# Patient Record
Sex: MALE | Race: WHITE
[De-identification: names, ages, dates, MRNs, and addresses within clinical notes are randomized per-mention and may not be internally consistent; named-entity substitution may affect disease eponyms.]

---

## 2019-03-11 ENCOUNTER — HOSPITAL ENCOUNTER (INPATIENT)
Dept: HOSPITAL 82 - MS2 | Age: 54
LOS: 2 days | Discharge: ANOTHER HOSPITAL | DRG: 470 | End: 2019-03-13
Attending: INTERNAL MEDICINE | Admitting: ORTHOPAEDIC SURGERY
Payer: COMMERCIAL

## 2019-03-11 VITALS — SYSTOLIC BLOOD PRESSURE: 108 MMHG | DIASTOLIC BLOOD PRESSURE: 69 MMHG

## 2019-03-11 VITALS — DIASTOLIC BLOOD PRESSURE: 74 MMHG | SYSTOLIC BLOOD PRESSURE: 122 MMHG

## 2019-03-11 VITALS — SYSTOLIC BLOOD PRESSURE: 131 MMHG | DIASTOLIC BLOOD PRESSURE: 78 MMHG

## 2019-03-11 VITALS — DIASTOLIC BLOOD PRESSURE: 68 MMHG | SYSTOLIC BLOOD PRESSURE: 123 MMHG

## 2019-03-11 VITALS — SYSTOLIC BLOOD PRESSURE: 118 MMHG | DIASTOLIC BLOOD PRESSURE: 79 MMHG

## 2019-03-11 VITALS — SYSTOLIC BLOOD PRESSURE: 121 MMHG | DIASTOLIC BLOOD PRESSURE: 77 MMHG

## 2019-03-11 VITALS — DIASTOLIC BLOOD PRESSURE: 70 MMHG | SYSTOLIC BLOOD PRESSURE: 135 MMHG

## 2019-03-11 VITALS — SYSTOLIC BLOOD PRESSURE: 122 MMHG | DIASTOLIC BLOOD PRESSURE: 74 MMHG

## 2019-03-11 DIAGNOSIS — N40.0: ICD-10-CM

## 2019-03-11 DIAGNOSIS — M17.12: Primary | ICD-10-CM

## 2019-03-11 DIAGNOSIS — F41.9: ICD-10-CM

## 2019-03-11 DIAGNOSIS — K21.9: ICD-10-CM

## 2019-03-11 DIAGNOSIS — K75.9: ICD-10-CM

## 2019-03-11 PROCEDURE — 0SRD0J9 REPLACEMENT OF LEFT KNEE JOINT WITH SYNTHETIC SUBSTITUTE, CEMENTED, OPEN APPROACH: ICD-10-PCS | Performed by: ORTHOPAEDIC SURGERY

## 2019-03-11 NOTE — NUR
PT MEDICATED AS ORDERS PROVIDE AND ASSESSED AT THIS TIME. ACE TO LEFT KNEE,
ICEPACK TO KNEE, TOES ARE WARM AND PT ABLE TO MOVE TOES. NO S/O DISTRESS
NOTED, PT DENIES ANY NEEDS OTHER THAN MORE WATER AT THIS TIME. CALL LIGHT AT
BEDSIDE. GUARDS X2 AT BEDSIDE.

## 2019-03-11 NOTE — NUR
PT REPORTS "ITCHING" TO ABDOMEN AND UPPER CHEST;NO REDDNESS OR RASH NOTED;MD
NOTIFIED AND NO NEW ORDERS RECEIVED AT THIS TIME;WILL CONTINUE TO MONITOR

## 2019-03-11 NOTE — NUR
PATIENT HAS JUST GOTTEN UP TO THE FLOOR FROM SURGERY. HE HAS HAD A SPINAL
BLOCK, THEREFORE P.T. WILL BE HELD UNTIL TOMORROW.

## 2019-03-11 NOTE — NUR
PT ARRIVED TO FLOOR VIA HOSPITAL BED IN STABLE CONDITION ACCOMPANIED BY X2
GUARDS AND OR STAFF MEMBER;PT AWAKE, ALERT AND ORIENTED X3;ORIENTED PT TO ROOM
AND CALL LIGHT SYSTEM;PT IS POST OP LEFT KNEE ARTHROPLASTY, 03/11/19;WT AND VS
OBTAINED BY DEION ALEJANDRA;ASSESSMENT COMPLETED;PT DENIES ANY CURRENT PAIN,PAIN
SCALE AND REPORTING EDUCATED IT SHOULD BE NOTED THAT PT DID HAVE A SPINAL
BLOCK;RESPIRATIONS EVEN AND UNLABORED ON O2 @ 2L VIA NC, CLEAR LUNG
SOUNDS;I.S. PROVIDED AND PT EDUCATED ON USE 10X PER HOUR,DEMONSTRATED
UNDERSTANDING;ABDOMEN DISTENDED/SOFT ON PALPATION AND HYPOACTIVE IN ALL 4
QUADRANTS,LAST BM 03/10/19;LEFT KNEE ACE WRAP DRESSING IN PLACE,CDI AND ICE
PACKS PROVIDED;STRONG PEDAL PULSES WITH CAP REFILL LESS THAN 3 SECONDS;SCD'S
IN PLACE;#20G TO LEFT HAND INFUSING LR @ 100ML/HR,SITE APPEARS HEALTHY;ZHAO
CATHETER PATENT DRAINING CLEAR/YELLOW URINE WITH EASE,LEG STRAP IN PLACE;PT
DENIES ANY ADDITIONAL NEEDS AT THIS TIME AND IS ENCOURAGED TO CALL FOR
ASSISTANCE IF NEEDED;CALL LIGHT IN REACH;WILL CONTINUE TO MONITOR

## 2019-03-12 VITALS — DIASTOLIC BLOOD PRESSURE: 73 MMHG | SYSTOLIC BLOOD PRESSURE: 120 MMHG

## 2019-03-12 VITALS — SYSTOLIC BLOOD PRESSURE: 125 MMHG | DIASTOLIC BLOOD PRESSURE: 77 MMHG

## 2019-03-12 VITALS — SYSTOLIC BLOOD PRESSURE: 140 MMHG | DIASTOLIC BLOOD PRESSURE: 76 MMHG

## 2019-03-12 VITALS — DIASTOLIC BLOOD PRESSURE: 84 MMHG | SYSTOLIC BLOOD PRESSURE: 141 MMHG

## 2019-03-12 VITALS — DIASTOLIC BLOOD PRESSURE: 79 MMHG | SYSTOLIC BLOOD PRESSURE: 143 MMHG

## 2019-03-12 VITALS — SYSTOLIC BLOOD PRESSURE: 167 MMHG | DIASTOLIC BLOOD PRESSURE: 94 MMHG

## 2019-03-12 LAB
HCT VFR BLD AUTO: 36.5 % (ref 39–50)
HGB BLD-MCNC: 12.1 G/DL (ref 14–18)

## 2019-03-12 NOTE — NUR
PT RESTING IN SEMI FOWLERS POSITION WITH X2 GUARDS AT BEDSIDE;RESPIRATIONS
EVEN AND UNLABORED ON RA;PT DENIES ANY CURRENT PAIN OR NEEDS;TEMP DECREASED TO
99.1, AC REMAINS LOWERED;ZHAO CATHETER PATENT DRAINING TO GRAVITY WITH
EASE;ALL SAFETY PRECAUTIONS REINFORCED WITH BED IN THE LOWEST POSITION AND
CALL LIGHT IN REACH;WILL CONTINUE TO MONITOR

## 2019-03-12 NOTE — NUR
ROSA REPORTS THAT PT IV WAS FOUND "HANGING OUT/REMOVED" PT DENIES KNOWLEDGE OF
WHAT HAPPENED TO IV SITE. POC DISCUSSED W/PT, PT QUESTIONED WRITER ON PURPOSE
OF IV SITE, MEDICATIONS AND POC DISCUSSED W/PT AGAIN AND HE ASKED NOT TO HAVE
NEW IV SITE BACK IN DUE TO LACK OF IV MEDICATION.

## 2019-03-12 NOTE — NUR
Pt seen this pm for treatment. He was awake in bed, A/AAROM in supine and
sitting, including quad and gluteal set, hip IR/ABD/heel slides, LAQ. Pt was
able to contract quad. Supine to sit with bed rail and supervision, assist
with LLE required to move to supine. Proper use of crutches and gait pattern
reviewed. Pt ambulated 1 x 20' with CGA assist using crutches and WB as
tolerated. Pt returned to supin and positioned properly. Guards present in
room.

## 2019-03-12 NOTE — NUR
PT MEDICATED AS ORDERS PROVIDE. LUNG SOUNDS ARE CLEAR, ABD SOFT NON-TENDER,
ACE TO L KNEE CDI, PEDAL PULSES ARE STRONG W/TRACE EDEMA W/GOOD ROM TO
TOES/FEET. PT LOCX3. GUARDS X2 AT BEDSIDE. POC DISCUSSED. PT REPORTS USING
IS/AT BEDSIDE. DENIES DIFFICULTY URINATING/USING URINAL AT BEDSIDE. WILL
CONTINUE TO MONITOR, PT HAS BEEN ENCOURAGED TO CALL.

## 2019-03-12 NOTE — NUR
PT WAS SLEEPING AS I ENTERED THE ROOM. PT MEDICATED W/IV ANTIBIOTIC THERAPY AS
ORDERS PROVIDE. NO S/O DISTRESS NOTED. GUARDS X2 AT BEDSIDE.

## 2019-03-12 NOTE — NUR
REPORT RECEIVED FROM MICHOACANO JONES;PT APPEARS TO BE SLEEPING IN SEMI FOWLERS
POSITION WITH X2 GUARDS AT BEDSIDE;RESPIRATIONS APPEAR EVEN AND UNLABORED ON
O2 @ 2L VIA NC;NO S/S OF DISTRESS NOTED;FALL PRECAUTIONS IN PLACE WITH BED IN
THE LOWEST POSITION AND CALL LIGHT IN REACH;WILL CONTINUE TO MONITOR

## 2019-03-12 NOTE — NUR
PT RESTING IN SEMI FOWLERS POSITION WITH X2 GUARDS AT BEDSIDE;VS OBTAINED AND
ASSESSMENT COMPLETED,CURRENT TEMP 100.6. PT TO BE MEDICATED WITH PRN TYELNOL
650MG PO PER ORDER,BLANKETS REMOVED AND AC LOWERED;PT IS POST OP DAY #1 LEFT
TOTAL KNEE ARTHROPLASTY 03/11/19 WITH SPINAL BLOCK;PT DENIES ANY CURRENT PAIN
AT THIS TIME,PAIN SCALE AND REPORTING EDUCATED;RESPIRATIONS EVEN AND UNLABORED
ON RA,CLEAR LUNG SOUNDS NOTED;I.S. AT BEDSIDE AND PT DEMONSTRATED
USE,ENCOURAGED USE 10X PER HOUR;ABDOMEN DISTENDED/SOFT ON PALPATION AND ACTIVE
IN ALL 4 QUADRANTS;ZHAO CATHETER PATENT DRAINING NATALIIA/YELLOW URINE,LEG STRAP
IN PLACE. ENCOURAGED PO FLUIDS;ACE WRAP DRESSING REMAINS CDI AT THIS TIME,
TRACE EDEMA NOTED TO LEFT KNEE;#20G TO LEFT HAND FLUSHED AND PATENT,DRESSING
CHANGED AT THIS TIME;PT DENIES ANY ADDITIONAL NEEDS AND IS ENCOURAGED TO CALL
FOR ASSISTANCE IF NEEDED;FALL PRECAUTIONS IN PLACE WITH CALL LIGHT IN
REACH;WILL CONTINUE TO MONITOR

## 2019-03-12 NOTE — NUR
PT REPORTS PAIN RATING 7/10 ON THE PAIN SCALE AND REQUESTS PAIN MEDICATION;PT
MEDICATED IWHT PRN PERCOCET 10/325MG 1 COMBO PO,WILL CONTINUE TO MONITOR FOR
EFFECTIVENESS.

## 2019-03-12 NOTE — NUR
PT RESTING IN SEMI FOWLERS POSITION WITH X2 GUARDS AT BEDSIDE;RESPIRATIONS
EVEN AND UNLABORED ON RA;PT REPORTS PAIN RELIEF SINCE PAIN MEDICATION
ADMINISTRATION;IV SITE TO LEFT HAND PATENT;PT VOIDED 200CC OF NATALIIA/CLEAR
URINE;PT DENIES ANY ADDITIONAL NEEDS;WILL CONTINUE TO MONITOR

## 2019-03-13 VITALS — SYSTOLIC BLOOD PRESSURE: 144 MMHG | DIASTOLIC BLOOD PRESSURE: 83 MMHG

## 2019-03-13 VITALS — DIASTOLIC BLOOD PRESSURE: 89 MMHG | SYSTOLIC BLOOD PRESSURE: 149 MMHG

## 2019-03-13 VITALS — SYSTOLIC BLOOD PRESSURE: 167 MMHG | DIASTOLIC BLOOD PRESSURE: 86 MMHG

## 2019-03-13 VITALS — DIASTOLIC BLOOD PRESSURE: 90 MMHG | SYSTOLIC BLOOD PRESSURE: 148 MMHG

## 2019-03-13 VITALS — DIASTOLIC BLOOD PRESSURE: 78 MMHG | SYSTOLIC BLOOD PRESSURE: 163 MMHG

## 2019-03-13 VITALS — DIASTOLIC BLOOD PRESSURE: 78 MMHG | SYSTOLIC BLOOD PRESSURE: 127 MMHG

## 2019-03-13 LAB
ALBUMIN SERPL-MCNC: 3 G/DL (ref 3.2–5)
ALP SERPL-CCNC: 75 U/L (ref 38–126)
AMYLASE SERPL-CCNC: 36 U/L (ref 30–110)
ANION GAP SERPL CALCULATED.3IONS-SCNC: 12 MMOL/L
AST SERPL-CCNC: 19 U/L (ref 17–59)
BASOPHILS NFR BLD AUTO: 0 % (ref 0–3)
BUN SERPL-MCNC: 15 MG/DL (ref 9–20)
BUN/CREAT SERPL: 20
CHLORIDE SERPL-SCNC: 103 MMOL/L (ref 95–108)
CO2 SERPL-SCNC: 26 MMOL/L (ref 22–30)
CREAT SERPL-MCNC: 0.8 MG/DL (ref 0.7–1.3)
EOSINOPHIL NFR BLD AUTO: 2 % (ref 0–8)
ERYTHROCYTE [DISTWIDTH] IN BLOOD BY AUTOMATED COUNT: 12.8 % (ref 11.5–15.5)
HCT VFR BLD AUTO: 35 % (ref 39–50)
HGB BLD-MCNC: 11.8 G/DL (ref 14–18)
IMM GRANULOCYTES NFR BLD: 0.2 % (ref 0–5)
LIPASE SERPL-CCNC: 40 U/L (ref 23–300)
LYMPHOCYTES NFR BLD: 15 % (ref 15–41)
MAGNESIUM SERPL-MCNC: 1.8 MG/DL (ref 1.6–2.3)
MCH RBC QN AUTO: 31.1 PG  CALC (ref 26–32)
MCHC RBC AUTO-ENTMCNC: 33.7 G/L CALC (ref 32–36)
MCV RBC AUTO: 92.1 FL  CALC (ref 80–100)
MONOCYTES NFR BLD AUTO: 15 % (ref 2–13)
NEUTROPHILS # BLD AUTO: 6.31 THOU/UL (ref 1.82–7.42)
NEUTROPHILS NFR BLD AUTO: 68 % (ref 42–76)
PLATELET # BLD AUTO: 142 THOU/UL (ref 130–400)
POTASSIUM SERPL-SCNC: 3.6 MMOL/L (ref 3.5–5.1)
PROT SERPL-MCNC: 5.2 G/DL (ref 6.3–8.2)
RBC # BLD AUTO: 3.8 MILL/UL (ref 4.7–6.1)
SODIUM SERPL-SCNC: 137 MMOL/L (ref 137–146)

## 2019-03-13 NOTE — NUR
PT MEDICATED FOR TEMP .5, ROOM COOLED, BLANKETS REMOVED. PT ASYMPTOMATIC
AT THIS TIME. GUARDS AT BEDSIDE X2

## 2019-03-13 NOTE — NUR
REPORT RECEIVED FROM MICHOACANO JONES;PT APPEARS TO BE SLEEPING IN SEMI FOWLERS
POSITION WITH X2 GUARDS AT BEDSIDE;RESPIRATIONS EVEN AND UNLABORED ON RA;NO
S/S OF DISTRESS NOTED;FALL PRECAUTIONS IN PLACE WITH BED IN THE LOWEST
POSITION AND CALL LIGHT IN REACH;WILL CONTINUE TO MONITOR

## 2019-03-13 NOTE — NUR
Discharge instructions given. Patient verbalizes understanding of same.
Discharged in stable condition via Wheelchair to *Other with
*Other. All belongings sent with pt.
Pt transported to [facility] via wheelchair in stable condition accompanied by
two holly.

## 2019-03-13 NOTE — NUR
Pt seen this pm for treatment. He was in bed. AROM/AAROM to LLE including
quad/gluteal set, heel slides, TKE, hip abd/IR, ankle DF, LAQ 20 reps each. Pt
moved supine to sit with supervision. Ambulated 1x 80' with CGA and verbal
cues for proper gait pattern. Pt returned to room and left sitting in chair
with call bell and tray in reach, guards present.

## 2019-03-13 NOTE — NUR
PT RESTING IN BED EATING LUNCH WITH X2 GUARDS AT BEDSIDE;RESPIRATIONS EVEN AND
UNLABORED ON RA;PT DENIES ANY CURRENT PAIN OR NEEDS;ASSESSMENT REMAINS
UNCHANGED AT THIS TIME;ENCOURAGED TO CALL FOR ASSISTANCE IF NEEDED;CALL LIGHT
IN REACH;WILL CONTINUE TO MONITOR

## 2019-03-13 NOTE — NUR
PT RESTING IN SEMI FOWLERS POSITION WITH X2 GUARDS AT BEDSIDE;RESPIRATIONS
EVEN AND UNLABORED ON RA;PT DENIES ANY CURRENT PAIN OR NEEDS;AWAITING
DISCHARGE ORDERS;CALL LIGHT IN REACH;WILL CONTINUE TO MONITOR

## 2019-03-13 NOTE — NUR
3-13-19:
Pt received supine in bed, pt reported he had attempted to use the restroom 2
times during the night but was unsuccessful in bed.  He was able to ambulate
throught out the night with his crutches to go to the restroom.  Pt was then
asked if he wished to participate in therapy today, he acknowledged and agreed
to tx.  He performed some mild therex in bed with ankle pumps, quad sets,
glute sets and heel slides.  From them pt was able to S<>S with Min A.  Pt
then ambulated in hallway 50 ft x 2 with a 10 sec rest break in between.  Pt
was cued on proper mechanics with crutches and keeping his eyes up and proper
breathing mechanics.  Pt was then asisted to bathroom where he was Min A S<>S
to toilet transfer.  Pt had a successful BM and was Min A S<>S to wash hands
and retrun to bed.  Pt was again Min A to S<>S to sit in bed and was left in
bed in supine with pneumonic compression on RLE.
Gil Barrett SPT

## 2019-03-13 NOTE — NUR
PT RESTING IN SEMI FOWLERS POSITION WITH X2 GUARDS AT BEDSIDE AND ONE LEG
SHACKLED TO THE BED;VS OBTAINED AND ASSESSMENT COMPLETED;PT IS POST OP LEFT
TOTAL KNEE ARTHROPLASTY 03/11/19;PT REPORTS PAIN TO LEFT KNEE RATING 6/10 ON
THE PAIN SCALE AND REQUESTS PAIN MEDICATION;PT MEDICATED WITH PRN PERCOCET
10/325MG 1 COMBO PO;RESPIRATIONS EVEN AND UNLABORED,SHALLOW ON RA;I.S. USE
ENCOURAGED AND PT DEMONSTRATED UNDERSTANDING;ABDOMEN DISTENDED/SOFT ON
PALPATION AND ACTIVE IN ALL 4 QUADRANTS;DRESSING TO LEFT KNEE CDI WITH TRACE
EDEMA NOTED;STRONG PEDAL PULSES WITH CAP REFILL LESS THAN 3 SECONDS;NO IV
SITE, PT REFUSED ON NIGHTSHIFT FOR NEW SITE TO BE STARTED AFTER SITE WAS FOUND
DISLODGED;PT DENIES ANY ADDITIONAL NEEDS AT THIS TIME AND IS ENCOURAGED TO
CALL FOR ASSISTANCE IF NEEDED;FALL PRECAUTIONS IN PLACE WITH CALL LIGHT IN
REACH;WILL CONTINUE TO MONITOR

## 2019-03-13 NOTE — NUR
ACE WRAP DRESSING REMOVED AT THIS TIME PER ORDER,PT TOLERATED WELL.DEMABOND
REMAINS CDI AND WELL APPROX.WILL CONTINUE TO MONITOR

## 2019-03-13 NOTE — NUR
ALL DISCHARGE INSTRUCTIONS PROVIDED AT THIS TIME;RX FOR PERCOCET AND ASPIRIN
GIVEN TO THE GUARDS TO RETURN TO [facility]RECTIONAL FACILITY;PT MEDICATED WITH
TYLENOL 650MG PO FOR TEMP .8;PT DENIES ANY ADDITIONAL NEEDS;WHEELCHAIR
TO BE PROVIDED FOR DISCHARGE.

## 2019-03-16 ENCOUNTER — HOSPITAL ENCOUNTER (EMERGENCY)
Dept: HOSPITAL 82 - ED | Age: 54
LOS: 1 days | Discharge: ANOTHER HOSPITAL | DRG: 603 | End: 2019-03-17
Payer: COMMERCIAL

## 2019-03-16 VITALS — SYSTOLIC BLOOD PRESSURE: 128 MMHG | DIASTOLIC BLOOD PRESSURE: 69 MMHG

## 2019-03-16 VITALS — HEIGHT: 71 IN | BODY MASS INDEX: 32.13 KG/M2 | WEIGHT: 229.5 LBS

## 2019-03-16 DIAGNOSIS — L53.9: ICD-10-CM

## 2019-03-16 DIAGNOSIS — R22.42: ICD-10-CM

## 2019-03-16 DIAGNOSIS — Z96.652: ICD-10-CM

## 2019-03-16 DIAGNOSIS — L03.116: Primary | ICD-10-CM

## 2019-03-16 DIAGNOSIS — F17.200: ICD-10-CM

## 2019-03-16 LAB
ANION GAP SERPL CALCULATED.3IONS-SCNC: 11 MMOL/L
APTT PPP: 28.6 SECONDS (ref 20–32.5)
BASOPHILS NFR BLD AUTO: 1 % (ref 0–3)
BUN SERPL-MCNC: 15 MG/DL (ref 9–20)
BUN/CREAT SERPL: 18
CHLORIDE SERPL-SCNC: 100 MMOL/L (ref 95–108)
CO2 SERPL-SCNC: 29 MMOL/L (ref 22–30)
CREAT SERPL-MCNC: 0.8 MG/DL (ref 0.7–1.3)
EOSINOPHIL NFR BLD AUTO: 3 % (ref 0–8)
ERYTHROCYTE [DISTWIDTH] IN BLOOD BY AUTOMATED COUNT: 12.9 % (ref 11.5–15.5)
HCT VFR BLD AUTO: 31.8 % (ref 39–50)
HGB BLD-MCNC: 10.6 G/DL (ref 14–18)
IMM GRANULOCYTES NFR BLD: 0.5 % (ref 0–5)
INR PPP: 0.9 RATIO (ref 0.7–1.3)
LYMPHOCYTES NFR BLD: 18 % (ref 15–41)
MCH RBC QN AUTO: 31.1 PG  CALC (ref 26–32)
MCHC RBC AUTO-ENTMCNC: 33.3 G/L CALC (ref 32–36)
MCV RBC AUTO: 93.3 FL  CALC (ref 80–100)
MONOCYTES NFR BLD AUTO: 16 % (ref 2–13)
NEUTROPHILS # BLD AUTO: 4.95 THOU/UL (ref 1.82–7.42)
NEUTROPHILS NFR BLD AUTO: 62 % (ref 42–76)
PLATELET # BLD AUTO: 235 THOU/UL (ref 130–400)
POTASSIUM SERPL-SCNC: 4 MMOL/L (ref 3.5–5.1)
PROTHROMBIN TIME: 9.7 SECONDS (ref 9–12.5)
RBC # BLD AUTO: 3.41 MILL/UL (ref 4.7–6.1)
SODIUM SERPL-SCNC: 136 MMOL/L (ref 137–146)

## 2019-09-18 ENCOUNTER — HOSPITAL ENCOUNTER (EMERGENCY)
Dept: HOSPITAL 82 - ED | Age: 54
Discharge: NURSING, REHAB OR LONG-TERM CARE | DRG: 81 | End: 2019-09-18
Payer: COMMERCIAL

## 2019-09-18 VITALS — SYSTOLIC BLOOD PRESSURE: 129 MMHG | DIASTOLIC BLOOD PRESSURE: 67 MMHG

## 2019-09-18 VITALS — WEIGHT: 223.55 LBS | BODY MASS INDEX: 31.3 KG/M2 | HEIGHT: 71 IN

## 2019-09-18 DIAGNOSIS — R40.20: Primary | ICD-10-CM

## 2019-09-18 LAB
ALBUMIN SERPL-MCNC: 3.5 G/DL (ref 3.2–5)
ALP SERPL-CCNC: 92 U/L (ref 38–126)
ANION GAP SERPL CALCULATED.3IONS-SCNC: 14 MMOL/L
AST SERPL-CCNC: 28 U/L (ref 17–59)
BARBITURATES UR-MCNC: NEGATIVE UG/ML
BASOPHILS NFR BLD AUTO: 0 % (ref 0–3)
BUN SERPL-MCNC: 22 MG/DL (ref 9–20)
BUN/CREAT SERPL: 19
CHLORIDE SERPL-SCNC: 103 MMOL/L (ref 95–108)
CO2 SERPL-SCNC: 29 MMOL/L (ref 22–30)
COCAINE UR-MCNC: NEGATIVE NG/ML
CREAT SERPL-MCNC: 1.2 MG/DL (ref 0.7–1.3)
EOSINOPHIL NFR BLD AUTO: 0 % (ref 0–8)
ERYTHROCYTE [DISTWIDTH] IN BLOOD BY AUTOMATED COUNT: 14.2 % (ref 11.5–15.5)
HCT VFR BLD AUTO: 42.2 % (ref 39–50)
HGB BLD-MCNC: 14 G/DL (ref 14–18)
IMM GRANULOCYTES NFR BLD: 0.8 % (ref 0–5)
LYMPHOCYTES NFR BLD: 5 % (ref 15–41)
MCH RBC QN AUTO: 30.5 PG  CALC (ref 26–32)
MCHC RBC AUTO-ENTMCNC: 33.2 G/L CALC (ref 32–36)
MCV RBC AUTO: 91.9 FL  CALC (ref 80–100)
METHADONE SERPL-MCNC: NEGATIVE NG/ML
MONOCYTES NFR BLD AUTO: 7 % (ref 2–13)
MYOGLOBIN SERPL-MCNC: 396 NG/ML (ref 0–121)
NEUTROPHILS # BLD AUTO: 10.02 THOU/UL (ref 1.82–7.42)
NEUTROPHILS NFR BLD AUTO: 86 % (ref 42–76)
OXCYCODONE: POSITIVE
PLATELET # BLD AUTO: 161 THOU/UL (ref 130–400)
POTASSIUM SERPL-SCNC: 4.5 MMOL/L (ref 3.5–5.1)
PROT SERPL-MCNC: 6.1 G/DL (ref 6.3–8.2)
RBC # BLD AUTO: 4.59 MILL/UL (ref 4.7–6.1)
SODIUM SERPL-SCNC: 141 MMOL/L (ref 137–146)
TETRAHYDROCANNABIONOL: NEGATIVE
TRICYLIC ANTIDEPRESSANTS: NEGATIVE

## 2019-09-18 PROCEDURE — 0BH17EZ INSERTION OF ENDOTRACHEAL AIRWAY INTO TRACHEA, VIA NATURAL OR ARTIFICIAL OPENING: ICD-10-PCS | Performed by: EMERGENCY MEDICINE

## 2019-09-18 PROCEDURE — 0T9B70Z DRAINAGE OF BLADDER WITH DRAINAGE DEVICE, VIA NATURAL OR ARTIFICIAL OPENING: ICD-10-PCS | Performed by: EMERGENCY MEDICINE

## 2019-09-18 NOTE — NUR
PATIENT GOT INTUBATED AT 0415 WITH A 8.0 ETT AT 23 CM OF THE LIPS. TUBE
VERIFIED BY CO2 DETECTOR, BREATH SOUND AND THEN X-RAY. PLACED ON VENTILATOR
WITH THE FOLLOWING SETTING : AC 16, , PEEP 5 WITH 100%. ABG ORDERED FOR
30 MINUTES AFTER BEING ON THE VENTILATOR. WILL CONTINUE MONITORING THE
PATIENT.

## 2020-02-21 ENCOUNTER — HOSPITAL ENCOUNTER (INPATIENT)
Age: 55
Discharge: ANOTHER HOSPITAL | DRG: 917 | End: 2020-02-21
Admitting: INTERNAL MEDICINE
Payer: COMMERCIAL

## 2020-02-21 VITALS — DIASTOLIC BLOOD PRESSURE: 56 MMHG | SYSTOLIC BLOOD PRESSURE: 103 MMHG

## 2020-02-21 VITALS — SYSTOLIC BLOOD PRESSURE: 83 MMHG | DIASTOLIC BLOOD PRESSURE: 60 MMHG

## 2020-02-21 VITALS — SYSTOLIC BLOOD PRESSURE: 125 MMHG | DIASTOLIC BLOOD PRESSURE: 82 MMHG

## 2020-02-21 VITALS — DIASTOLIC BLOOD PRESSURE: 52 MMHG | SYSTOLIC BLOOD PRESSURE: 127 MMHG

## 2020-02-21 VITALS — SYSTOLIC BLOOD PRESSURE: 96 MMHG | DIASTOLIC BLOOD PRESSURE: 54 MMHG

## 2020-02-21 VITALS — DIASTOLIC BLOOD PRESSURE: 51 MMHG | SYSTOLIC BLOOD PRESSURE: 97 MMHG

## 2020-02-21 VITALS — DIASTOLIC BLOOD PRESSURE: 60 MMHG | SYSTOLIC BLOOD PRESSURE: 91 MMHG

## 2020-02-21 VITALS — DIASTOLIC BLOOD PRESSURE: 51 MMHG | SYSTOLIC BLOOD PRESSURE: 104 MMHG

## 2020-02-21 VITALS — DIASTOLIC BLOOD PRESSURE: 86 MMHG | SYSTOLIC BLOOD PRESSURE: 153 MMHG

## 2020-02-21 VITALS — DIASTOLIC BLOOD PRESSURE: 52 MMHG | SYSTOLIC BLOOD PRESSURE: 93 MMHG

## 2020-02-21 VITALS — DIASTOLIC BLOOD PRESSURE: 72 MMHG | SYSTOLIC BLOOD PRESSURE: 114 MMHG

## 2020-02-21 VITALS — SYSTOLIC BLOOD PRESSURE: 103 MMHG | DIASTOLIC BLOOD PRESSURE: 62 MMHG

## 2020-02-21 VITALS — SYSTOLIC BLOOD PRESSURE: 93 MMHG | DIASTOLIC BLOOD PRESSURE: 49 MMHG

## 2020-02-21 VITALS — SYSTOLIC BLOOD PRESSURE: 87 MMHG | DIASTOLIC BLOOD PRESSURE: 46 MMHG

## 2020-02-21 VITALS — SYSTOLIC BLOOD PRESSURE: 85 MMHG | DIASTOLIC BLOOD PRESSURE: 52 MMHG

## 2020-02-21 VITALS — DIASTOLIC BLOOD PRESSURE: 88 MMHG | SYSTOLIC BLOOD PRESSURE: 163 MMHG

## 2020-02-21 VITALS — SYSTOLIC BLOOD PRESSURE: 97 MMHG | DIASTOLIC BLOOD PRESSURE: 45 MMHG

## 2020-02-21 VITALS — SYSTOLIC BLOOD PRESSURE: 106 MMHG | DIASTOLIC BLOOD PRESSURE: 63 MMHG

## 2020-02-21 VITALS — SYSTOLIC BLOOD PRESSURE: 103 MMHG | DIASTOLIC BLOOD PRESSURE: 56 MMHG

## 2020-02-21 VITALS — DIASTOLIC BLOOD PRESSURE: 49 MMHG | SYSTOLIC BLOOD PRESSURE: 92 MMHG

## 2020-02-21 VITALS — DIASTOLIC BLOOD PRESSURE: 46 MMHG | SYSTOLIC BLOOD PRESSURE: 77 MMHG

## 2020-02-21 VITALS — DIASTOLIC BLOOD PRESSURE: 37 MMHG | SYSTOLIC BLOOD PRESSURE: 71 MMHG

## 2020-02-21 VITALS — DIASTOLIC BLOOD PRESSURE: 42 MMHG | SYSTOLIC BLOOD PRESSURE: 79 MMHG

## 2020-02-21 VITALS — DIASTOLIC BLOOD PRESSURE: 50 MMHG | SYSTOLIC BLOOD PRESSURE: 96 MMHG

## 2020-02-21 VITALS — DIASTOLIC BLOOD PRESSURE: 68 MMHG | SYSTOLIC BLOOD PRESSURE: 133 MMHG

## 2020-02-21 VITALS — SYSTOLIC BLOOD PRESSURE: 95 MMHG | DIASTOLIC BLOOD PRESSURE: 52 MMHG

## 2020-02-21 VITALS — DIASTOLIC BLOOD PRESSURE: 53 MMHG | SYSTOLIC BLOOD PRESSURE: 99 MMHG

## 2020-02-21 VITALS — DIASTOLIC BLOOD PRESSURE: 79 MMHG | SYSTOLIC BLOOD PRESSURE: 140 MMHG

## 2020-02-21 VITALS — DIASTOLIC BLOOD PRESSURE: 44 MMHG | SYSTOLIC BLOOD PRESSURE: 75 MMHG

## 2020-02-21 VITALS — DIASTOLIC BLOOD PRESSURE: 54 MMHG | SYSTOLIC BLOOD PRESSURE: 93 MMHG

## 2020-02-21 VITALS — SYSTOLIC BLOOD PRESSURE: 136 MMHG | DIASTOLIC BLOOD PRESSURE: 78 MMHG

## 2020-02-21 VITALS — SYSTOLIC BLOOD PRESSURE: 75 MMHG | DIASTOLIC BLOOD PRESSURE: 40 MMHG

## 2020-02-21 VITALS — DIASTOLIC BLOOD PRESSURE: 82 MMHG | SYSTOLIC BLOOD PRESSURE: 137 MMHG

## 2020-02-21 VITALS — DIASTOLIC BLOOD PRESSURE: 73 MMHG | SYSTOLIC BLOOD PRESSURE: 120 MMHG

## 2020-02-21 VITALS — DIASTOLIC BLOOD PRESSURE: 40 MMHG | SYSTOLIC BLOOD PRESSURE: 69 MMHG

## 2020-02-21 VITALS — SYSTOLIC BLOOD PRESSURE: 111 MMHG | DIASTOLIC BLOOD PRESSURE: 75 MMHG

## 2020-02-21 VITALS — SYSTOLIC BLOOD PRESSURE: 168 MMHG | DIASTOLIC BLOOD PRESSURE: 86 MMHG

## 2020-02-21 VITALS — DIASTOLIC BLOOD PRESSURE: 46 MMHG | SYSTOLIC BLOOD PRESSURE: 72 MMHG

## 2020-02-21 VITALS — DIASTOLIC BLOOD PRESSURE: 49 MMHG | SYSTOLIC BLOOD PRESSURE: 91 MMHG

## 2020-02-21 VITALS — DIASTOLIC BLOOD PRESSURE: 81 MMHG | SYSTOLIC BLOOD PRESSURE: 131 MMHG

## 2020-02-21 VITALS — SYSTOLIC BLOOD PRESSURE: 97 MMHG | DIASTOLIC BLOOD PRESSURE: 49 MMHG

## 2020-02-21 VITALS — DIASTOLIC BLOOD PRESSURE: 50 MMHG | SYSTOLIC BLOOD PRESSURE: 91 MMHG

## 2020-02-21 VITALS — DIASTOLIC BLOOD PRESSURE: 39 MMHG | SYSTOLIC BLOOD PRESSURE: 74 MMHG

## 2020-02-21 VITALS — SYSTOLIC BLOOD PRESSURE: 92 MMHG | DIASTOLIC BLOOD PRESSURE: 46 MMHG

## 2020-02-21 VITALS — SYSTOLIC BLOOD PRESSURE: 138 MMHG | DIASTOLIC BLOOD PRESSURE: 74 MMHG

## 2020-02-21 VITALS — DIASTOLIC BLOOD PRESSURE: 52 MMHG | SYSTOLIC BLOOD PRESSURE: 95 MMHG

## 2020-02-21 VITALS — SYSTOLIC BLOOD PRESSURE: 81 MMHG | DIASTOLIC BLOOD PRESSURE: 46 MMHG

## 2020-02-21 VITALS — DIASTOLIC BLOOD PRESSURE: 76 MMHG | SYSTOLIC BLOOD PRESSURE: 115 MMHG

## 2020-02-21 VITALS — SYSTOLIC BLOOD PRESSURE: 120 MMHG | DIASTOLIC BLOOD PRESSURE: 64 MMHG

## 2020-02-21 VITALS — SYSTOLIC BLOOD PRESSURE: 98 MMHG | DIASTOLIC BLOOD PRESSURE: 44 MMHG

## 2020-02-21 VITALS — DIASTOLIC BLOOD PRESSURE: 69 MMHG | SYSTOLIC BLOOD PRESSURE: 116 MMHG

## 2020-02-21 VITALS — SYSTOLIC BLOOD PRESSURE: 110 MMHG | DIASTOLIC BLOOD PRESSURE: 56 MMHG

## 2020-02-21 VITALS — SYSTOLIC BLOOD PRESSURE: 145 MMHG | DIASTOLIC BLOOD PRESSURE: 69 MMHG

## 2020-02-21 VITALS — DIASTOLIC BLOOD PRESSURE: 71 MMHG | SYSTOLIC BLOOD PRESSURE: 138 MMHG

## 2020-02-21 DIAGNOSIS — J96.01: ICD-10-CM

## 2020-02-21 DIAGNOSIS — F32.9: ICD-10-CM
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
## 2020-02-21 DIAGNOSIS — A41.9: ICD-10-CM

## 2020-02-21 DIAGNOSIS — Z91.5: ICD-10-CM

## 2020-02-21 DIAGNOSIS — J69.0: ICD-10-CM

## 2020-02-21 DIAGNOSIS — G62.9: ICD-10-CM

## 2020-02-21 DIAGNOSIS — I95.9: ICD-10-CM

## 2020-02-21 DIAGNOSIS — I48.91: ICD-10-CM

## 2020-02-21 DIAGNOSIS — G93.40: ICD-10-CM

## 2020-02-21 DIAGNOSIS — T40.602A: Primary | ICD-10-CM

## 2020-02-21 DIAGNOSIS — J96.02: ICD-10-CM

## 2020-02-21 DIAGNOSIS — R65.21: ICD-10-CM

## 2020-02-21 DIAGNOSIS — E87.2: ICD-10-CM

## 2020-02-21 LAB
ALBUMIN SERPL-MCNC: 3.1 G/DL (ref 3.2–5)
ALBUMIN SERPL-MCNC: 4.2 G/DL (ref 3.2–5)
ALP SERPL-CCNC: 100 U/L (ref 38–126)
ALP SERPL-CCNC: 92 U/L (ref 38–126)
ANION GAP SERPL CALCULATED.3IONS-SCNC: 12 MMOL/L
ANION GAP SERPL CALCULATED.3IONS-SCNC: 6 MMOL/L
APTT PPP: 26 SECONDS (ref 20–32.5)
AST SERPL-CCNC: 25 U/L (ref 17–59)
AST SERPL-CCNC: 30 U/L (ref 17–59)
BARBITURATES UR-MCNC: NEGATIVE UG/ML
BASOPHILS NFR BLD AUTO: 0 % (ref 0–3)
BILIRUB UR QL STRIP.AUTO: NEGATIVE
BUN SERPL-MCNC: 10 MG/DL (ref 9–20)
BUN SERPL-MCNC: 19 MG/DL (ref 9–20)
BUN/CREAT SERPL: 14
BUN/CREAT SERPL: 16
CHLORIDE SERPL-SCNC: 107 MMOL/L (ref 95–108)
CHLORIDE SERPL-SCNC: 115 MMOL/L (ref 95–108)
CO2 SERPL-SCNC: 25 MMOL/L (ref 22–30)
CO2 SERPL-SCNC: 27 MMOL/L (ref 22–30)
COCAINE UR-MCNC: NEGATIVE NG/ML
COLOR UR AUTO: YELLOW
CREAT SERPL-MCNC: 0.7 MG/DL (ref 0.7–1.3)
CREAT SERPL-MCNC: 1.2 MG/DL (ref 0.7–1.3)
EOSINOPHIL NFR BLD AUTO: 0 % (ref 0–8)
ERYTHROCYTE [DISTWIDTH] IN BLOOD BY AUTOMATED COUNT: 14.8 % (ref 11.5–15.5)
ETHANOL SERPL-MCNC: 0 MG/DL (ref 0–30)
GLUCOSE UR STRIP.AUTO-MCNC: NEGATIVE MG/DL
HCT VFR BLD AUTO: 44.6 % (ref 39–50)
HGB BLD-MCNC: 14.6 G/DL (ref 14–18)
HGB UR QL STRIP.AUTO: (no result)
IMM GRANULOCYTES NFR BLD: 0.4 % (ref 0–5)
INR PPP: 1 RATIO (ref 0.7–1.3)
KETONES UR STRIP.AUTO-MCNC: NEGATIVE MG/DL
LEUKOCYTE ESTERASE UR QL STRIP.AUTO: NEGATIVE
LYMPHOCYTES NFR BLD: 10 % (ref 15–41)
MAGNESIUM SERPL-MCNC: 2.2 MG/DL (ref 1.6–2.3)
MCH RBC QN AUTO: 29.8 PG  CALC (ref 26–32)
MCHC RBC AUTO-ENTMCNC: 32.7 G/L CALC (ref 32–36)
MCV RBC AUTO: 91 FL  CALC (ref 80–100)
METHADONE SERPL-MCNC: NEGATIVE NG/ML
MONOCYTES NFR BLD AUTO: 10 % (ref 2–13)
NEUTROPHILS # BLD AUTO: 10.19 THOU/UL (ref 1.82–7.42)
NEUTROPHILS NFR BLD AUTO: 79 % (ref 42–76)
NITRITE UR QL STRIP.AUTO: NEGATIVE
OXCYCODONE: POSITIVE
PH UR STRIP.AUTO: 6 [PH] (ref 4.5–8)
PLATELET # BLD AUTO: 186 THOU/UL (ref 130–400)
POTASSIUM SERPL-SCNC: 3.8 MMOL/L (ref 3.5–5.1)
POTASSIUM SERPL-SCNC: 4 MMOL/L (ref 3.5–5.1)
PROT SERPL-MCNC: 5.7 G/DL (ref 6.3–8.2)
PROT SERPL-MCNC: 7 G/DL (ref 6.3–8.2)
PROT UR QL STRIP.AUTO: NEGATIVE MG/DL
PROTHROMBIN TIME: 10.4 SECONDS (ref 9–12.5)
RBC # BLD AUTO: 4.9 MILL/UL (ref 4.7–6.1)
SODIUM SERPL-SCNC: 140 MMOL/L (ref 137–146)
SODIUM SERPL-SCNC: 143 MMOL/L (ref 137–146)
SP GR UR STRIP.AUTO: 1.02
TETRAHYDROCANNABIONOL: NEGATIVE
TRICYLIC ANTIDEPRESSANTS: NEGATIVE
UROBILINOGEN UR QL STRIP.AUTO: 0.2 E.U./DL

## 2020-02-21 PROCEDURE — 0T9B70Z DRAINAGE OF BLADDER WITH DRAINAGE DEVICE, VIA NATURAL OR ARTIFICIAL OPENING: ICD-10-PCS | Performed by: EMERGENCY MEDICINE

## 2020-02-21 PROCEDURE — 5A1935Z RESPIRATORY VENTILATION, LESS THAN 24 CONSECUTIVE HOURS: ICD-10-PCS | Performed by: EMERGENCY MEDICINE

## 2020-02-21 PROCEDURE — 0BH17EZ INSERTION OF ENDOTRACHEAL AIRWAY INTO TRACHEA, VIA NATURAL OR ARTIFICIAL OPENING: ICD-10-PCS | Performed by: EMERGENCY MEDICINE

## 2020-02-21 PROCEDURE — 02HV33Z INSERTION OF INFUSION DEVICE INTO SUPERIOR VENA CAVA, PERCUTANEOUS APPROACH: ICD-10-PCS | Performed by: SURGERY
